# Patient Record
Sex: FEMALE | Race: WHITE | Employment: FULL TIME | URBAN - METROPOLITAN AREA
[De-identification: names, ages, dates, MRNs, and addresses within clinical notes are randomized per-mention and may not be internally consistent; named-entity substitution may affect disease eponyms.]

---

## 2017-02-01 ENCOUNTER — OFFICE VISIT (OUTPATIENT)
Dept: FAMILY MEDICINE CLINIC | Facility: CLINIC | Age: 22
End: 2017-02-01

## 2017-02-01 VITALS
HEIGHT: 64 IN | HEART RATE: 106 BPM | WEIGHT: 130 LBS | SYSTOLIC BLOOD PRESSURE: 140 MMHG | DIASTOLIC BLOOD PRESSURE: 80 MMHG | TEMPERATURE: 98 F | BODY MASS INDEX: 22.2 KG/M2 | OXYGEN SATURATION: 98 %

## 2017-02-01 DIAGNOSIS — J20.9 ACUTE BRONCHITIS WITH ASTHMA WITH ACUTE EXACERBATION: Primary | ICD-10-CM

## 2017-02-01 DIAGNOSIS — J45.901 ACUTE BRONCHITIS WITH ASTHMA WITH ACUTE EXACERBATION: Primary | ICD-10-CM

## 2017-02-01 PROCEDURE — 99213 OFFICE O/P EST LOW 20 MIN: CPT | Performed by: PHYSICIAN ASSISTANT

## 2017-02-01 RX ORDER — PREDNISONE 20 MG/1
20 TABLET ORAL 2 TIMES DAILY
Qty: 10 TABLET | Refills: 0 | Status: SHIPPED | OUTPATIENT
Start: 2017-02-01 | End: 2017-02-06

## 2017-02-01 RX ORDER — AZITHROMYCIN 250 MG/1
TABLET, FILM COATED ORAL
Qty: 6 TABLET | Refills: 0 | Status: SHIPPED | OUTPATIENT
Start: 2017-02-01 | End: 2017-03-08 | Stop reason: ALTCHOICE

## 2017-02-01 NOTE — PATIENT INSTRUCTIONS
1.  Zithromax as prescribed, you will take this for 5 days and it stays in your system for 10 days   2. Prednisone 20 mg twice daily for 5 days.    3.  Xopenex inhaler 2 puffs inhaled 3 times daily for 5 days while on steroids to help with breathing/wheezi · Take medicine as directed. You may be told to take ibuprofen or other over-the-counter medicines. These help relieve inflammation in your bronchial tubes. Your doctor may prescribe an inhaler to help open up the bronchial tubes.  Most of the time, acute b

## 2017-02-01 NOTE — PROGRESS NOTES
CHIEF COMPLAINT:   Patient presents with:  Cough: chest congestion, wheezing, sob, headaches X5day        HPI:   Zohra Banuelos is a 24year old female who presents for cough for  5  days.   Cough started gradually and is described as productive and at HENT: Denies ear pain, decreased hearing, or sore throat. Reports mild sinus congestion. CARDIOVASCULAR: Denies chest pain or palpitations  LUNGS: Per HPI. GI: Denies N/V/C/D or abdominal pain.       EXAM:   /80 mmHg  Pulse 106  Temp(Src) 98 °F (3 Fluticasone (FLOVENT DISKUS) 250 MCG/BLIST Inhalation Aerosol Powder, Breath Activated 60 each 0      Sig: Inhale 1 puff into the lungs 2 (two) times daily. Side effects, risks, benefits, of medication explained and discussed.     Patient Instruc Your healthcare provider will examine you and ask about your symptoms and health history. You may also have a sputum culture to test the fluid in your lungs. Chest X-rays may be done to look for infection in the lungs.   Treating acute bronchitis  [employer] © 9905-2396 The [address] [place] [address], [address] [place]. All rights reserved. This information is not intended as a substitute for professional medical care. Always follow your healthcare professional's instructions.               Inessa Kena

## 2017-02-06 ENCOUNTER — NURSE ONLY (OUTPATIENT)
Dept: FAMILY MEDICINE CLINIC | Facility: CLINIC | Age: 22
End: 2017-02-06

## 2017-02-06 VITALS
HEART RATE: 102 BPM | HEIGHT: 64 IN | DIASTOLIC BLOOD PRESSURE: 80 MMHG | TEMPERATURE: 99 F | SYSTOLIC BLOOD PRESSURE: 116 MMHG | BODY MASS INDEX: 21.85 KG/M2 | WEIGHT: 128 LBS | OXYGEN SATURATION: 98 %

## 2017-02-06 DIAGNOSIS — J45.901 ASTHMA FLARE: ICD-10-CM

## 2017-02-06 DIAGNOSIS — J01.01 ACUTE RECURRENT MAXILLARY SINUSITIS: Primary | ICD-10-CM

## 2017-02-06 PROCEDURE — 99213 OFFICE O/P EST LOW 20 MIN: CPT | Performed by: PHYSICIAN ASSISTANT

## 2017-02-06 RX ORDER — LEVALBUTEROL TARTRATE 45 UG/1
2 AEROSOL, METERED ORAL EVERY 6 HOURS PRN
Qty: 1 INHALER | Refills: 0 | Status: SHIPPED | OUTPATIENT
Start: 2017-02-06 | End: 2021-04-01

## 2017-02-06 RX ORDER — AMOXICILLIN AND CLAVULANATE POTASSIUM 875; 125 MG/1; MG/1
1 TABLET, FILM COATED ORAL 2 TIMES DAILY
Qty: 20 TABLET | Refills: 0 | Status: SHIPPED | OUTPATIENT
Start: 2017-02-06 | End: 2017-02-16

## 2017-02-06 NOTE — PROGRESS NOTES
CHIEF COMPLAINT:   Patient presents with:  Headache: body aches, sinus and chest congestion, cough was seen last week had reaction to antibiotic      HPI:   Shawn Gomes is a 24year old female who presents for persistent URI symptoms and asthma exac History reviewed. No pertinent past surgical history.         Smoking Status: Never Smoker                          REVIEW OF SYSTEMS:   GENERAL: decreased appetite  SKIN: no rashes or abnormal skin lesions  HEENT: See HPI  LUNGS: denies shortness of breath Levalbuterol Tartrate (XOPENEX HFA) 45 MCG/ACT Inhalation Aerosol 1 Inhaler 0      Sig: Inhale 2 puffs into the lungs every 6 (six) hours as needed for Wheezing or Shortness of Breath.       Amoxicillin-Pot Clavulanate 875-125 MG Oral Tab 20 tablet 0 Applying heat to the area surrounding your sinuses may make you feel more comfortable. Use a hot water bottle or a hand towel dipped in hot water. Some people also find ice packs effective for relieving pain.   Medicines  Your doctor may prescribe medicatio

## 2017-02-06 NOTE — PATIENT INSTRUCTIONS
1. Augmentin 875 mg twice daily for 10 days. Stop zithromax. 2.  Xopenex inhaler as prescribed. 3.  Continue Flovent as directed. 4.  Note issued for work.    5.  If cough is productive, recommend over the counter Mucinex to help thin secretions an Your doctor may prescribe medications to help treat your sinusitis. If you have an infection, antibiotics can help clear it up. If you are prescribed antibiotics, take all pills on schedule until they are gone, even if you feel better.  Decongestants help r

## 2017-02-15 ENCOUNTER — TELEPHONE (OUTPATIENT)
Dept: FAMILY MEDICINE CLINIC | Facility: CLINIC | Age: 22
End: 2017-02-15

## 2017-02-16 NOTE — TELEPHONE ENCOUNTER
Pt states she was having troubles with her contact. Stated she was able to get one contact out and thought the other was stuck.  Pt states she called the eye doctor, did not have the other contact in place- states it must have fallen out and didn't realize

## 2017-03-07 ENCOUNTER — TELEPHONE (OUTPATIENT)
Dept: FAMILY MEDICINE CLINIC | Facility: CLINIC | Age: 22
End: 2017-03-07

## 2017-03-08 RX ORDER — CLONAZEPAM 0.5 MG/1
0.5 TABLET ORAL AS NEEDED
Refills: 1 | COMMUNITY
Start: 2017-02-06 | End: 2018-11-23 | Stop reason: ALTCHOICE

## 2017-03-08 RX ORDER — DEXTROAMPHETAMINE SACCHARATE, AMPHETAMINE ASPARTATE, DEXTROAMPHETAMINE SULFATE AND AMPHETAMINE SULFATE 5; 5; 5; 5 MG/1; MG/1; MG/1; MG/1
20 TABLET ORAL DAILY
Qty: 30 TABLET | Refills: 0 | Status: SHIPPED | OUTPATIENT
Start: 2017-03-08 | End: 2017-04-11

## 2017-03-28 ENCOUNTER — APPOINTMENT (OUTPATIENT)
Dept: LAB | Age: 22
End: 2017-03-28
Attending: FAMILY MEDICINE
Payer: COMMERCIAL

## 2017-03-28 ENCOUNTER — OFFICE VISIT (OUTPATIENT)
Dept: FAMILY MEDICINE CLINIC | Facility: CLINIC | Age: 22
End: 2017-03-28

## 2017-03-28 VITALS
HEIGHT: 64 IN | HEART RATE: 96 BPM | RESPIRATION RATE: 20 BRPM | WEIGHT: 124 LBS | SYSTOLIC BLOOD PRESSURE: 130 MMHG | TEMPERATURE: 98 F | DIASTOLIC BLOOD PRESSURE: 86 MMHG | BODY MASS INDEX: 21.17 KG/M2

## 2017-03-28 DIAGNOSIS — Z12.4 CERVICAL CANCER SCREENING: ICD-10-CM

## 2017-03-28 DIAGNOSIS — Z01.419 ENCOUNTER FOR GYNECOLOGICAL EXAMINATION: Primary | ICD-10-CM

## 2017-03-28 DIAGNOSIS — Z72.51 UNPROTECTED SEXUAL INTERCOURSE: ICD-10-CM

## 2017-03-28 DIAGNOSIS — N89.8 VAGINAL DISCHARGE: ICD-10-CM

## 2017-03-28 DIAGNOSIS — A63.0 VAGINAL VENEREAL WARTS: ICD-10-CM

## 2017-03-28 PROCEDURE — 87389 HIV-1 AG W/HIV-1&-2 AB AG IA: CPT

## 2017-03-28 PROCEDURE — 86803 HEPATITIS C AB TEST: CPT

## 2017-03-28 PROCEDURE — 99214 OFFICE O/P EST MOD 30 MIN: CPT | Performed by: NURSE PRACTITIONER

## 2017-03-28 PROCEDURE — 87625 HPV TYPES 16 & 18 ONLY: CPT | Performed by: NURSE PRACTITIONER

## 2017-03-28 PROCEDURE — 87624 HPV HI-RISK TYP POOLED RSLT: CPT | Performed by: NURSE PRACTITIONER

## 2017-03-28 PROCEDURE — 81003 URINALYSIS AUTO W/O SCOPE: CPT

## 2017-03-28 PROCEDURE — 87591 N.GONORRHOEAE DNA AMP PROB: CPT

## 2017-03-28 PROCEDURE — 86706 HEP B SURFACE ANTIBODY: CPT

## 2017-03-28 PROCEDURE — 88175 CYTOPATH C/V AUTO FLUID REDO: CPT | Performed by: NURSE PRACTITIONER

## 2017-03-28 PROCEDURE — 87491 CHLMYD TRACH DNA AMP PROBE: CPT

## 2017-03-28 PROCEDURE — 86780 TREPONEMA PALLIDUM: CPT

## 2017-03-28 PROCEDURE — 87480 CANDIDA DNA DIR PROBE: CPT | Performed by: NURSE PRACTITIONER

## 2017-03-28 PROCEDURE — 87660 TRICHOMONAS VAGIN DIR PROBE: CPT | Performed by: NURSE PRACTITIONER

## 2017-03-28 PROCEDURE — 87510 GARDNER VAG DNA DIR PROBE: CPT | Performed by: NURSE PRACTITIONER

## 2017-03-28 PROCEDURE — 87340 HEPATITIS B SURFACE AG IA: CPT

## 2017-03-28 NOTE — PATIENT INSTRUCTIONS
Blood work and urine tests today. Pap smear collected, will take 1-2 weeks to get results. Will call with results. Use podofilox cream twice a day x 3 days, then do not use it x 4 days. May repeat this cycle 4x.  May need cryotherapy to area--Dr. NADEEN Gordillo

## 2017-03-28 NOTE — PROGRESS NOTES
Shad Bradshaw is a 24year old female.     Chief Complaint:  Patient presents with:  Vaginal Problem: check for vaginal warts, requesting Pap smear     HPI:     Patient presents with complaint of vaginal problem, thinks that she may have vaginal yina History:    Smoking Status: Never Smoker                         REVIEW OF SYSTEMS:   GENERAL HEALTH: feels well otherwise  SKIN: denies any unusual skin lesions or rashes  RESPIRATORY: denies shortness of breath with exertion  CARDIOVASCULAR: denies chest cream twice a day x 3 days, then do not use it x 4 days. May repeat this cycle 4x. May need cryotherapy to area--Dr. Kelli Valero and Erum SOARES do these procedures. Return if symptoms worsen or have concerns. STD panel ordered.   Collected Pap smear w

## 2017-03-29 ENCOUNTER — NURSE ONLY (OUTPATIENT)
Dept: FAMILY MEDICINE CLINIC | Facility: CLINIC | Age: 22
End: 2017-03-29

## 2017-03-29 ENCOUNTER — TELEPHONE (OUTPATIENT)
Dept: FAMILY MEDICINE CLINIC | Facility: CLINIC | Age: 22
End: 2017-03-29

## 2017-03-29 DIAGNOSIS — Z23 IMMUNIZATION DUE: Primary | ICD-10-CM

## 2017-03-29 LAB — HPV I/H RISK 1 DNA SPEC QL NAA+PROBE: POSITIVE

## 2017-03-29 PROCEDURE — 90651 9VHPV VACCINE 2/3 DOSE IM: CPT | Performed by: NURSE PRACTITIONER

## 2017-03-29 PROCEDURE — 90471 IMMUNIZATION ADMIN: CPT | Performed by: NURSE PRACTITIONER

## 2017-03-29 NOTE — TELEPHONE ENCOUNTER
----- Message from RODGER Moreland sent at 3/29/2017 11:27 AM CDT -----  Vaginosis DNA probe results show positive for bacterial vaginosis. Please reassure patient that this is not an STD, it is just overproduction of bacteria in the vagina area.   We terence

## 2017-03-29 NOTE — TELEPHONE ENCOUNTER
----- Message from RODGER Steen sent at 3/29/2017 11:40 AM CDT -----  egative HIV results. Negative syphilis results. Negative hep b results. Negative urinalysis.   Still waiting on pap smear results, HPV results, gonorrhea/chlamydia results, and he

## 2017-03-29 NOTE — TELEPHONE ENCOUNTER
Patient notified of results and recommendations, patient states she will come in today to get HPV injection

## 2017-03-30 ENCOUNTER — TELEPHONE (OUTPATIENT)
Dept: FAMILY MEDICINE CLINIC | Facility: CLINIC | Age: 22
End: 2017-03-30

## 2017-03-30 LAB
HPV16 DNA CVX QL PROBE+SIG AMP: NEGATIVE
HPV18 DNA CVX QL PROBE+SIG AMP: NEGATIVE

## 2017-03-30 NOTE — TELEPHONE ENCOUNTER
Patient informed of results. Expressed understanding.    Followup appt scheduled for April 15 at 1030  Patient expressed understanding

## 2017-03-30 NOTE — TELEPHONE ENCOUNTER
----- Message from RODGER Chávez sent at 3/30/2017  9:21 AM CDT -----  HPV high risk positive. Recommend patient to colposcopy. Patient can schedule this with Dr. Jonathan Leggett. Please schedule f/u with pcp.

## 2017-03-31 ENCOUNTER — TELEPHONE (OUTPATIENT)
Dept: FAMILY MEDICINE CLINIC | Facility: CLINIC | Age: 22
End: 2017-03-31

## 2017-03-31 NOTE — TELEPHONE ENCOUNTER
----- Message from RODGER Em sent at 3/30/2017  3:29 PM CDT -----  Gated , 18, 45. Pap smear results: Low-grade squamous intraepithelial lesion. Keep appointment with PCP, Dr. Virginie Chavez, on April 15 to discuss results and colposcopy.

## 2017-04-11 ENCOUNTER — TELEPHONE (OUTPATIENT)
Dept: FAMILY MEDICINE CLINIC | Facility: CLINIC | Age: 22
End: 2017-04-11

## 2017-04-11 RX ORDER — DEXTROAMPHETAMINE SACCHARATE, AMPHETAMINE ASPARTATE, DEXTROAMPHETAMINE SULFATE AND AMPHETAMINE SULFATE 5; 5; 5; 5 MG/1; MG/1; MG/1; MG/1
20 TABLET ORAL DAILY
Qty: 30 TABLET | Refills: 0 | Status: SHIPPED | OUTPATIENT
Start: 2017-04-11 | End: 2017-05-15

## 2017-04-15 ENCOUNTER — OFFICE VISIT (OUTPATIENT)
Dept: FAMILY MEDICINE CLINIC | Facility: CLINIC | Age: 22
End: 2017-04-15

## 2017-04-15 VITALS
WEIGHT: 124.81 LBS | SYSTOLIC BLOOD PRESSURE: 120 MMHG | HEART RATE: 92 BPM | RESPIRATION RATE: 12 BRPM | BODY MASS INDEX: 21.31 KG/M2 | HEIGHT: 64 IN | DIASTOLIC BLOOD PRESSURE: 90 MMHG | TEMPERATURE: 98 F

## 2017-04-15 DIAGNOSIS — Z20.2 EXPOSURE TO SEXUALLY TRANSMITTED DISEASE (STD): ICD-10-CM

## 2017-04-15 DIAGNOSIS — N92.6 IRREGULAR MENSTRUAL CYCLE: ICD-10-CM

## 2017-04-15 DIAGNOSIS — R87.612 LGSIL ON PAP SMEAR OF CERVIX: ICD-10-CM

## 2017-04-15 DIAGNOSIS — N91.2 AMENORRHEA: Primary | ICD-10-CM

## 2017-04-15 DIAGNOSIS — A63.0 GENITAL WARTS: ICD-10-CM

## 2017-04-15 PROCEDURE — 99214 OFFICE O/P EST MOD 30 MIN: CPT | Performed by: FAMILY MEDICINE

## 2017-04-15 PROCEDURE — 81025 URINE PREGNANCY TEST: CPT | Performed by: FAMILY MEDICINE

## 2017-04-15 RX ORDER — IMIQUIMOD 12.5 MG/.25G
CREAM TOPICAL
Qty: 24 PACKET | Refills: 1 | Status: SHIPPED | OUTPATIENT
Start: 2017-04-15 | End: 2017-07-12

## 2017-04-15 NOTE — PATIENT INSTRUCTIONS
STI work up reviewed    rec colposcopy to further evaluate abnomal pap    rec topical treatment with aldara for genital warts

## 2017-04-15 NOTE — PROGRESS NOTES
Randi Schroeder is a 24year old female. HPI:   Patient presents for recheck of her genital warts. Pt with small warts- larger and bothersome to her. Recent STI eval.  Negative hep b, HIV, sphyliss.  GCC  Pos HR- HPV, pap LGSIL    2 partners in last 6-2018 Date     REVIEW OF SYSTEMS:   GENERAL HEALTH: feels well otherwise  SKIN: denies any unusual skin lesions or rashes  RESPIRATORY: denies cough or shortness of breath  CARDIOVASCULAR: denies chest pain  GI: denies abdominal pain and denies heartburn

## 2017-05-15 ENCOUNTER — TELEPHONE (OUTPATIENT)
Dept: FAMILY MEDICINE CLINIC | Facility: CLINIC | Age: 22
End: 2017-05-15

## 2017-05-15 RX ORDER — DEXTROAMPHETAMINE SACCHARATE, AMPHETAMINE ASPARTATE, DEXTROAMPHETAMINE SULFATE AND AMPHETAMINE SULFATE 5; 5; 5; 5 MG/1; MG/1; MG/1; MG/1
20 TABLET ORAL DAILY
Qty: 30 TABLET | Refills: 0 | Status: SHIPPED | OUTPATIENT
Start: 2017-05-15 | End: 2017-06-19

## 2017-06-19 ENCOUNTER — TELEPHONE (OUTPATIENT)
Dept: FAMILY MEDICINE CLINIC | Facility: CLINIC | Age: 22
End: 2017-06-19

## 2017-06-19 RX ORDER — DEXTROAMPHETAMINE SACCHARATE, AMPHETAMINE ASPARTATE, DEXTROAMPHETAMINE SULFATE AND AMPHETAMINE SULFATE 5; 5; 5; 5 MG/1; MG/1; MG/1; MG/1
20 TABLET ORAL DAILY
Qty: 30 TABLET | Refills: 0 | Status: SHIPPED | OUTPATIENT
Start: 2017-06-19 | End: 2017-07-26

## 2017-06-19 NOTE — TELEPHONE ENCOUNTER
Patient states that she was seen approx. 1 month ago for a consultation for a colposcopy and she has been a little busy to call back and schedule the procedure.   Patient states that she is leaving to go back to [facility] on June 28th and would like to know

## 2017-06-20 NOTE — TELEPHONE ENCOUNTER
Patient informed of recommended of appointment for colposcopy as per Dr. Chiara Blevins. Appointment made.

## 2017-06-20 NOTE — TELEPHONE ENCOUNTER
Patient states that the first day of her last menstrual cycle was 5/26/17 and she will return from [place] on July 5th. Please advise of appointment.

## 2017-06-20 NOTE — TELEPHONE ENCOUNTER
Please check with pt her schedule, date of LMP and when whe will return from [place] so appt can be appropriately scheduled

## 2017-07-10 ENCOUNTER — TELEPHONE (OUTPATIENT)
Dept: FAMILY MEDICINE CLINIC | Facility: CLINIC | Age: 22
End: 2017-07-10

## 2017-07-10 NOTE — TELEPHONE ENCOUNTER
Patient informed of below. Expressed understanding. Confirmed Appt time.   Joseline Arnett, 07/10/17, 2:59 PM

## 2017-07-10 NOTE — TELEPHONE ENCOUNTER
Future Appointments  Date Time Provider Diana Pineda   7/12/2017 4:00 PM Merrill Mortensen MD [facility]      Pt with appt in 2 days- await appt.

## 2017-07-12 ENCOUNTER — OFFICE VISIT (OUTPATIENT)
Dept: FAMILY MEDICINE CLINIC | Facility: CLINIC | Age: 22
End: 2017-07-12

## 2017-07-12 VITALS
DIASTOLIC BLOOD PRESSURE: 66 MMHG | TEMPERATURE: 98 F | WEIGHT: 128.5 LBS | RESPIRATION RATE: 16 BRPM | BODY MASS INDEX: 21.94 KG/M2 | SYSTOLIC BLOOD PRESSURE: 112 MMHG | HEART RATE: 72 BPM | HEIGHT: 64 IN

## 2017-07-12 DIAGNOSIS — R10.30 LOWER ABDOMINAL PAIN: Primary | ICD-10-CM

## 2017-07-12 DIAGNOSIS — Z01.812 PRE-PROCEDURE LAB EXAM: ICD-10-CM

## 2017-07-12 DIAGNOSIS — N87.9 CERVICAL DYSPLASIA: ICD-10-CM

## 2017-07-12 LAB
APPEARANCE: CLEAR
BILIRUBIN: NEGATIVE
CONTROL LINE PRESENT WITH A CLEAR BACKGROUND (YES/NO): YES YES/NO
GLUCOSE (URINE DIPSTICK): NEGATIVE MG/DL
LEUKOCYTES: NEGATIVE
MULTISTIX LOT#: NORMAL NUMERIC
NITRITE, URINE: NEGATIVE
OCCULT BLOOD: NEGATIVE
PH, URINE: 7 (ref 4.5–8)
PREGNANCY TEST, URINE: NEGATIVE
PROTEIN (URINE DIPSTICK): 30 MG/DL
SPECIFIC GRAVITY: 1.02 (ref 1–1.03)
URINE-COLOR: YELLOW
UROBILINOGEN,SEMI-QN: 0.2 MG/DL (ref 0–1.9)

## 2017-07-12 PROCEDURE — 88305 TISSUE EXAM BY PATHOLOGIST: CPT | Performed by: FAMILY MEDICINE

## 2017-07-12 PROCEDURE — 81003 URINALYSIS AUTO W/O SCOPE: CPT | Performed by: FAMILY MEDICINE

## 2017-07-12 PROCEDURE — 88342 IMHCHEM/IMCYTCHM 1ST ANTB: CPT | Performed by: FAMILY MEDICINE

## 2017-07-12 PROCEDURE — 99213 OFFICE O/P EST LOW 20 MIN: CPT | Performed by: FAMILY MEDICINE

## 2017-07-12 PROCEDURE — 81025 URINE PREGNANCY TEST: CPT | Performed by: FAMILY MEDICINE

## 2017-07-12 PROCEDURE — 57454 BX/CURETT OF CERVIX W/SCOPE: CPT | Performed by: FAMILY MEDICINE

## 2017-07-12 RX ORDER — IMIQUIMOD 12.5 MG/.25G
CREAM TOPICAL
Qty: 24 PACKET | Refills: 1 | Status: SHIPPED | OUTPATIENT
Start: 2017-07-12 | End: 2017-11-06 | Stop reason: ALTCHOICE

## 2017-07-14 NOTE — PROCEDURES
Colpo w/Cx Biopsy and ECC    Pregnancy Results: negative from urine test   Birth control method(s) used: condoms    Consent signed. Procedure discussed with patient in detail including indication, risk, benefits, alternatives and complications.     Indic

## 2017-07-14 NOTE — PATIENT INSTRUCTIONS
Post op instruction sheet given to pt     Pt to restart aldara cream for topical lesions    Return pending pathology results

## 2017-07-14 NOTE — PROGRESS NOTES
Delbert Ibanez is a 24year old female.      HPI:   Patient with mild intermittent abd pain, bilateral   No dysuria, no vaginal discharge  Pt active , no nausea or vomitting        Wt Readings from Last 6 Encounters:  07/12/17 : 128 lb 8 oz  04/15/17 : 025,581 Numeric   Multistix Expiration Date 4-30-18 Date   -URINE PREGNANCY TEST   Result Value Ref Range   Pregnancy Test, Urine Negative    Control Line Present with a clear background (yes/no) Yes Yes/No   Kit Lot # [id number] Numeric   Kit Expiration D

## 2017-07-19 ENCOUNTER — TELEPHONE (OUTPATIENT)
Dept: FAMILY MEDICINE CLINIC | Facility: CLINIC | Age: 22
End: 2017-07-19

## 2017-07-19 DIAGNOSIS — N87.9 CERVICAL DYSPLASIA: Primary | ICD-10-CM

## 2017-07-19 NOTE — PROGRESS NOTES
See progress note attached to surgical pathology note from colposcopy. Referral with pertinent records faxed to [facility].

## 2017-07-21 ENCOUNTER — TELEPHONE (OUTPATIENT)
Dept: FAMILY MEDICINE CLINIC | Facility: CLINIC | Age: 22
End: 2017-07-21

## 2017-07-21 NOTE — TELEPHONE ENCOUNTER
Spoke w/ Xavier Weston- pt was seen per Dr. Mame Gordon today, was recommended that pt wait 6-12 months prior to any procedure- to be monitored  However, pt would like a 2nd opinion due to concern that there a possibility that s/s/her condition can worsen.

## 2017-07-21 NOTE — TELEPHONE ENCOUNTER
Patient is requesting a referral to another dr for a second opinion- already saw dr at [place] [facility]

## 2017-07-21 NOTE — TELEPHONE ENCOUNTER
Radha Hidden for second opinoin- where would she like referral to? If looking for options-  [facility] [place] [facility] [place]. Radha Hidden for referral to doctor of choice.

## 2017-07-26 RX ORDER — DEXTROAMPHETAMINE SACCHARATE, AMPHETAMINE ASPARTATE, DEXTROAMPHETAMINE SULFATE AND AMPHETAMINE SULFATE 5; 5; 5; 5 MG/1; MG/1; MG/1; MG/1
20 TABLET ORAL DAILY
Qty: 30 TABLET | Refills: 0 | Status: SHIPPED | OUTPATIENT
Start: 2017-07-26 | End: 2017-11-06

## 2017-07-26 RX ORDER — DEXTROAMPHETAMINE SACCHARATE, AMPHETAMINE ASPARTATE, DEXTROAMPHETAMINE SULFATE AND AMPHETAMINE SULFATE 5; 5; 5; 5 MG/1; MG/1; MG/1; MG/1
20 TABLET ORAL DAILY
Qty: 30 TABLET | Refills: 0 | Status: CANCELLED | OUTPATIENT
Start: 2017-07-26

## 2017-07-26 NOTE — TELEPHONE ENCOUNTER
Future Appointments  Date Time Provider Diana Pineda   7/31/2017 3:30 PM Cortney Otero MD [facility] [place]     Is completely out of medications at this time.

## 2017-08-15 RX ORDER — PROPRANOLOL HYDROCHLORIDE 60 MG/1
60 TABLET ORAL AS NEEDED
Qty: 30 TABLET | Refills: 0 | Status: SHIPPED | OUTPATIENT
Start: 2017-08-15 | End: 2017-11-06

## 2017-08-15 NOTE — TELEPHONE ENCOUNTER
Pt informed that RX was sent. Pt states she was seen per Dr. Lindajo Gaucher and was advised to repeat colposcopy again in Jan.  Pt is considering a second opinion,  Steph Hernandez is requesting call back from [facility] to discuss if possible.

## 2017-08-15 NOTE — TELEPHONE ENCOUNTER
[id number] Diana Larkin for refill 30 tablets.    Please check if pt has f.u with [facility] re cervical dysplasia

## 2017-08-15 NOTE — TELEPHONE ENCOUNTER
Pt states she took her last dose of Propranolol today-  states she uses approximately one dose per wk for her episodes of tachycardia prn-  Pt states she does not see cardiologist  Last refilled back 2/8/16 (noted in [facility])

## 2017-09-11 ENCOUNTER — OFFICE VISIT (OUTPATIENT)
Dept: FAMILY MEDICINE CLINIC | Facility: CLINIC | Age: 22
End: 2017-09-11

## 2017-09-11 VITALS
DIASTOLIC BLOOD PRESSURE: 74 MMHG | WEIGHT: 128.25 LBS | RESPIRATION RATE: 18 BRPM | TEMPERATURE: 98 F | SYSTOLIC BLOOD PRESSURE: 128 MMHG | BODY MASS INDEX: 21.89 KG/M2 | HEIGHT: 64 IN | OXYGEN SATURATION: 93 % | HEART RATE: 84 BPM

## 2017-09-11 DIAGNOSIS — H66.001 ACUTE SUPPURATIVE OTITIS MEDIA OF RIGHT EAR WITHOUT SPONTANEOUS RUPTURE OF TYMPANIC MEMBRANE, RECURRENCE NOT SPECIFIED: Primary | ICD-10-CM

## 2017-09-11 DIAGNOSIS — J01.00 ACUTE NON-RECURRENT MAXILLARY SINUSITIS: ICD-10-CM

## 2017-09-11 PROCEDURE — 99213 OFFICE O/P EST LOW 20 MIN: CPT | Performed by: NURSE PRACTITIONER

## 2017-09-11 RX ORDER — AMOXICILLIN AND CLAVULANATE POTASSIUM 875; 125 MG/1; MG/1
1 TABLET, FILM COATED ORAL 2 TIMES DAILY
Qty: 20 TABLET | Refills: 0 | Status: SHIPPED | OUTPATIENT
Start: 2017-09-11 | End: 2017-09-21

## 2017-09-11 NOTE — PROGRESS NOTES
CHIEF COMPLAINT:   Patient presents with:  Headache: all day today  Earache: both ears ache, right more than left  Cough: no mucous, congested  Sore Throat      HPI:   Shalini Loving is a 20year old female who presents to clinic today with complaint rashes  HEENT: See HPI  THYROID: normal size, no nodules  LUNGS: No cough, shortness of breath, or wheezing. CARDIOVASCULAR: No chest pain, palpitations  GI: No N/V/C/D.   NEURO: denies complaints    EXAM:   /74 (BP Location: Left arm, Patient Tano Jones and benefits of medication discussed. Stressed importance of completing full course of antibiotic.        Meds & Refills for this Visit:    Signed Prescriptions Disp Refills    Amoxicillin-Pot Clavulanate 875-125 MG Oral Tab 20 tablet 0      Sig: Take 1 ta

## 2017-09-11 NOTE — PATIENT INSTRUCTIONS
Rest, increase fluids, salt water gargles ,neti pot (use distilled water) or saline nasal spray, Advil cold and sinus (behind the counter), Alavert, Tylenol,  follow up if symptoms persist or increase.

## 2017-10-09 ENCOUNTER — TELEPHONE (OUTPATIENT)
Dept: FAMILY MEDICINE CLINIC | Facility: CLINIC | Age: 22
End: 2017-10-09

## 2017-10-09 NOTE — TELEPHONE ENCOUNTER
I tried to reach Maximino Connor to let her know that she no showed for her appointment today , but her voicemail is full so I couldn't leave her a message.

## 2017-11-06 NOTE — PROGRESS NOTES
[facility]  PROGRESS NOTE  Chief Complaint:   Patient presents with:  ADD      HPI:   This is a 20year old female coming in for follow-up on her ADD. She said that she actually ran out of medication about a month ago.   She finds that i [address]                                                       Pathologist:           Berna Ulrich MD                                                         Specimens:   A) - Cervix, 6 oclock reviewed by the signout Pathologist and showed appropriate   staining results. Interpreted by: Conny Seip Bedelia Czech, MD  Methodology: Immunohistochemical stains are performed on formalin-fixed,   paraffin-embedded tissue sections.   Deparaffinization, antigen Specimen consists of scant pieces   of pink to yellow-tan admixed with mucoid material measuring in aggregate   1.0 x 1.0 x 0.3 cm.   Specimen is submitted in toto in cassette E.  (ZQ/al)      [FORMATTING REMOVED]    Interpretation Abnormal (A)        Past lesion, or excessive skin dryness. CARDIOVASCULAR:  Denies chest pain, chest pressure, chest discomfort, palpitations, edema, dyspnea on exertion or at rest.  RESPIRATORY: She really needs to use her inhalers.   However in this last week she has had a cold lesion, no bruising, good turgor. HEART:  Regular rate and rhythm, no murmurs, rubs or gallops. LUNGS: Clear to auscultation bilterally, no rales/rhonchi/wheezing. ASSESSMENT AND PLAN:   1.  Attention deficit disorder (ADD) without hyperactivity  She h

## 2017-12-06 RX ORDER — DEXTROAMPHETAMINE SACCHARATE, AMPHETAMINE ASPARTATE, DEXTROAMPHETAMINE SULFATE AND AMPHETAMINE SULFATE 5; 5; 5; 5 MG/1; MG/1; MG/1; MG/1
20 TABLET ORAL DAILY
Qty: 30 TABLET | Refills: 0 | Status: SHIPPED | OUTPATIENT
Start: 2017-12-06 | End: 2018-01-04

## 2017-12-06 NOTE — TELEPHONE ENCOUNTER
Future appt:    Last Appointment:  11/6/17  No results found for: CHOLEST, HDL, LDL, TRIGLY, TRIG  No results found for: EAG, A1C  No results found for: T4F, TSH, TSHT4    No Follow-up on file.

## 2018-01-04 ENCOUNTER — TELEPHONE (OUTPATIENT)
Dept: FAMILY MEDICINE CLINIC | Facility: CLINIC | Age: 23
End: 2018-01-04

## 2018-01-04 NOTE — TELEPHONE ENCOUNTER
Future appt:  None   Last Appointment:  11/6/2017; Return in about 6 months (around 5/6/2018).      No results found for: CHOLEST, HDL, LDL, TRIGLY, TRIG  No results found for: EAG, A1C  No results found for: T4F, TSH, TSHT4    Last Labs:  2/6/2013  Last RF

## 2018-01-05 RX ORDER — DEXTROAMPHETAMINE SACCHARATE, AMPHETAMINE ASPARTATE, DEXTROAMPHETAMINE SULFATE AND AMPHETAMINE SULFATE 5; 5; 5; 5 MG/1; MG/1; MG/1; MG/1
20 TABLET ORAL DAILY
Qty: 30 TABLET | Refills: 0 | Status: SHIPPED | OUTPATIENT
Start: 2018-01-05 | End: 2018-02-05

## 2018-01-19 ENCOUNTER — TELEPHONE (OUTPATIENT)
Dept: FAMILY MEDICINE CLINIC | Facility: CLINIC | Age: 23
End: 2018-01-19

## 2018-01-19 NOTE — TELEPHONE ENCOUNTER
is moving to [place] the first week of Feb- wants to know if she should come in for her ADD appt before she leaves

## 2018-01-19 NOTE — TELEPHONE ENCOUNTER
Appt given Friday 1/26 9:30 for ADD Follow up so She will have  6 months of refills that can be mailed to Her while She finds a new MD.  Mich Lyles, 01/19/18, 3:53 PM

## 2018-02-05 RX ORDER — DEXTROAMPHETAMINE SACCHARATE, AMPHETAMINE ASPARTATE, DEXTROAMPHETAMINE SULFATE AND AMPHETAMINE SULFATE 5; 5; 5; 5 MG/1; MG/1; MG/1; MG/1
20 TABLET ORAL DAILY
Qty: 30 TABLET | Refills: 0 | Status: SHIPPED | OUTPATIENT
Start: 2018-02-05 | End: 2021-08-17

## 2018-02-05 NOTE — TELEPHONE ENCOUNTER
Future appt:    Last Appointment:  11/6/17  Moving to [place]  No results found for: CHOLEST, HDL, LDL, TRIGLY, TRIG  No results found for: EAG, A1C  No results found for: T4F, TSH, TSHT4    No Follow-up on file.

## 2018-02-06 NOTE — PROGRESS NOTES
[facility]  PROGRESS NOTE  Chief Complaint:   Patient presents with:  ADD  Follow - Up      HPI:   This is a 20year old female coming in for follow-up on her ADD. She said that she takes her medicine intermittently.   She will take one 07/12/2017 05:05 32 [address]                                                       Pathologist:           Marcelle Colorado MD                                                         Specimens:   A) - Cervix, 6 in the staining process. These   slides were reviewed by the signout Pathologist and showed appropriate   staining results. Interpreted by: Carl Charles MD  Methodology: Immunohistochemical stains are performed on formalin-fixed,   paraffin-embedde curettings (ECC), received in formalin:  Specimen consists of scant pieces   of pink to yellow-tan admixed with mucoid material measuring in aggregate   1.0 x 1.0 x 0.3 cm.   Specimen is submitted in toto in cassette E.  (ZQ/al)      [FORMATTING REMOVED] blurred vision, double vision or yellow sclerae. Ears, Nose, Throat:  Denies hearing loss, sneezing, congestion, runny nose or sore throat. INTEGUMENTARY:  Denies rashes, itching, skin lesion, or excessive skin dryness.   CARDIOVASCULAR:  Denies chest pain CLAD, no JVD, no thyromegaly. SKIN: No rashes, no skin lesion, no bruising, good turgor. HEART:  Regular rate and rhythm, no murmurs, rubs or gallops. LUNGS: Clear to auscultation bilterally, no rales/rhonchi/wheezing. ASSESSMENT AND PLAN:   1.  Att

## 2018-03-05 ENCOUNTER — TELEPHONE (OUTPATIENT)
Dept: FAMILY MEDICINE CLINIC | Facility: CLINIC | Age: 23
End: 2018-03-05

## 2018-03-05 NOTE — TELEPHONE ENCOUNTER
Pt states she is having another flare up of genital warts  Pt asking for refill of Imiquimod 5% cream.  Pt mentioned that she recently was seen per Dr. Moises Whiteside in January for exam.  Pt urged to call [facility] for RX.

## 2018-06-18 ENCOUNTER — TELEPHONE (OUTPATIENT)
Dept: FAMILY MEDICINE CLINIC | Facility: CLINIC | Age: 23
End: 2018-06-18

## 2018-06-18 NOTE — TELEPHONE ENCOUNTER
Agree- I would be willing to send her 1 RF if she would like. If so- please enter the pharmacy into the system.

## 2018-06-18 NOTE — TELEPHONE ENCOUNTER
Propranolol last refilled back 11/6/17 for #30. Called pt- now living in [place]. States she has been out of her medication for one month. Pt states she has been having more problems lately, hx: tachycardia. Pt c/o chest pressure and shortness of breath.  P

## 2018-11-23 ENCOUNTER — OFFICE VISIT (OUTPATIENT)
Dept: FAMILY MEDICINE CLINIC | Facility: CLINIC | Age: 23
End: 2018-11-23
Payer: COMMERCIAL

## 2018-11-23 VITALS
HEART RATE: 80 BPM | RESPIRATION RATE: 16 BRPM | DIASTOLIC BLOOD PRESSURE: 78 MMHG | SYSTOLIC BLOOD PRESSURE: 122 MMHG | HEIGHT: 64.5 IN | WEIGHT: 134 LBS | BODY MASS INDEX: 22.6 KG/M2 | TEMPERATURE: 99 F

## 2018-11-23 DIAGNOSIS — H66.91 ACUTE OTITIS MEDIA, RIGHT: ICD-10-CM

## 2018-11-23 DIAGNOSIS — J02.9 SORE THROAT: Primary | ICD-10-CM

## 2018-11-23 PROCEDURE — 99214 OFFICE O/P EST MOD 30 MIN: CPT | Performed by: NURSE PRACTITIONER

## 2018-11-23 PROCEDURE — 87880 STREP A ASSAY W/OPTIC: CPT | Performed by: NURSE PRACTITIONER

## 2018-11-23 PROCEDURE — 87081 CULTURE SCREEN ONLY: CPT | Performed by: NURSE PRACTITIONER

## 2018-11-23 RX ORDER — AMOXICILLIN AND CLAVULANATE POTASSIUM 875; 125 MG/1; MG/1
1 TABLET, FILM COATED ORAL 2 TIMES DAILY
Qty: 20 TABLET | Refills: 0 | Status: SHIPPED | OUTPATIENT
Start: 2018-11-23 | End: 2018-12-03

## 2018-11-23 NOTE — PATIENT INSTRUCTIONS
Rest, increase fluids, salt water gargles ,neti pot (use distilled water) or saline nasal spray,  Advil cold and sinus (behind the counter), Alavert, Rhinocort 2 sprays each nostril once a day,  Tylenol follow up if symptoms persist or increase.

## 2018-11-23 NOTE — PROGRESS NOTES
CHIEF COMPLAINT:   Patient presents with:  Sore Throat: feels on fire, started first  Chest Congestion  Headache  Ear Pain      HPI:   Juan C Morales is a 24year old female who presents to clinic today with complaints of feeling ill Tuesday (11/20) Position: Sitting, Cuff Size: adult)   Pulse 80   Temp 98.7 °F (37.1 °C) (Temporal)   Resp 16   Ht 64.5\"   Wt 134 lb   BMI 22.65 kg/m²   GENERAL: well developed, well nourished,in no apparent distress  HEAD:  mild tenderness on palpation of maxillary sinu AM

## 2018-11-26 ENCOUNTER — TELEPHONE (OUTPATIENT)
Dept: FAMILY MEDICINE CLINIC | Facility: CLINIC | Age: 23
End: 2018-11-26

## 2018-11-26 RX ORDER — BENZONATATE 200 MG/1
200 CAPSULE ORAL 3 TIMES DAILY PRN
Qty: 30 CAPSULE | Refills: 1 | Status: SHIPPED | OUTPATIENT
Start: 2018-11-26 | End: 2021-04-01

## 2018-11-26 NOTE — TELEPHONE ENCOUNTER
The patient states she has had a fever running 99.6-100.6 and the patient states that her throat feels dry and like it is on fire. The patient has a cough that is getting worse and at times it is so bad that she almost throws up.  The patient also states t

## 2018-11-26 NOTE — TELEPHONE ENCOUNTER
What are her symptoms and what is she taking over the counter. - any fever, sore throat? Ear pain?  Etc?

## 2018-11-26 NOTE — TELEPHONE ENCOUNTER
Patient may have a upper respiratory infection  On top of the strep throat. Strep typically does not cause nasal congestion or cough. Recommend Mucinex-D 60/600 (behind the counter ) and Alavert during the day and Nyquil at night.  Also rest, increase fl

## 2018-11-26 NOTE — TELEPHONE ENCOUNTER
----- Message from YESENIA Rocha sent at 11/26/2018 12:25 PM CST -----  Please notify patient that her strep test came back positive for a non-group A strep–patient was given a prescription for Augmentin–she should finish prescription.   Follow-up i

## 2018-11-26 NOTE — TELEPHONE ENCOUNTER
The patient called back because she is still coughing she would like a cough syrup with codeine. The patient stated she has had it in the past and it worked for her.   The patient has to go to work on Wednesday and would like something to help decrease her

## 2018-11-26 NOTE — TELEPHONE ENCOUNTER
----- Message from Naomi Mcqueen sent at 11/26/2018  2:06 PM CST -----  Return call.  Question regarding medication for strep

## 2018-11-26 NOTE — TELEPHONE ENCOUNTER
The patient was informed and stated that she does not feel like she is getting any better. The patient stated that she is not able to come back for an appointment because she was here for her [facility] break and she is back in [place] now.      The cady

## 2021-04-01 NOTE — PROGRESS NOTES
Tressa Gloria is a 31year old female. Patient presents with: Anxiety  Anxiety/Panic attack    HPI:   Patient presents today for an office visit regarding anxiety.   Patient states that she recently had a 4-year relationship and–states that she was l ointment   APPLY TO THE AFFECTED AREA(S) BY TOPICAL ROUTE EVERY 3 HOURS 6 TIMES PER DAY (Patient not taking: Reported on 4/1/2021)     • Fluticasone Propionate HFA (FLOVENT HFA) 110 MCG/ACT Inhalation Aerosol Inhale 2 puffs into the lungs as needed.    Kasey Bah Differential W Platelet [E]      Comp Metabolic Panel (14) [E]      Lipid Panel [E]      TSH and Free T4 [E]      Magnesium [E]      Meds & Refills for this Visit:  Requested Prescriptions     Signed Prescriptions Disp Refills   • clonazePAM (KLONOPIN) 0.5

## 2021-04-29 ENCOUNTER — LAB ENCOUNTER (OUTPATIENT)
Dept: LAB | Age: 26
End: 2021-04-29
Attending: NURSE PRACTITIONER

## 2021-04-29 DIAGNOSIS — F41.9 ANXIETY AND DEPRESSION: ICD-10-CM

## 2021-04-29 DIAGNOSIS — F32.A ANXIETY AND DEPRESSION: ICD-10-CM

## 2021-04-29 DIAGNOSIS — R79.89 ABNORMAL CBC: ICD-10-CM

## 2021-04-29 PROCEDURE — 82728 ASSAY OF FERRITIN: CPT | Performed by: NURSE PRACTITIONER

## 2021-04-29 PROCEDURE — 83540 ASSAY OF IRON: CPT | Performed by: NURSE PRACTITIONER

## 2021-04-29 PROCEDURE — 80050 GENERAL HEALTH PANEL: CPT | Performed by: NURSE PRACTITIONER

## 2021-04-29 PROCEDURE — 82607 VITAMIN B-12: CPT | Performed by: NURSE PRACTITIONER

## 2021-04-29 PROCEDURE — 83550 IRON BINDING TEST: CPT | Performed by: NURSE PRACTITIONER

## 2021-04-29 PROCEDURE — 83735 ASSAY OF MAGNESIUM: CPT | Performed by: NURSE PRACTITIONER

## 2021-04-29 PROCEDURE — 80061 LIPID PANEL: CPT | Performed by: NURSE PRACTITIONER

## 2021-04-29 PROCEDURE — 84439 ASSAY OF FREE THYROXINE: CPT | Performed by: NURSE PRACTITIONER

## 2021-04-30 ENCOUNTER — TELEPHONE (OUTPATIENT)
Dept: FAMILY MEDICINE CLINIC | Facility: CLINIC | Age: 26
End: 2021-04-30

## 2021-04-30 NOTE — TELEPHONE ENCOUNTER
----- Message from YESENIA Edmonds sent at 4/30/2021  8:41 AM CDT -----  Please notify patient that her labs overall look good-cholesterol is good at 154-her HDLs (good cholesterol) are very good at 73, her LDLs are good at 64, triglycerides good at

## 2021-04-30 NOTE — TELEPHONE ENCOUNTER
----- Message from YESENIA Flores sent at 4/30/2021  9:51 AM CDT -----  Please notify patient that her iron is normal, her B12 is on the lower side of normal at 317-optimal would be around 500 recommend over-the-counter B12 supplement and foods rich

## 2021-04-30 NOTE — TELEPHONE ENCOUNTER
I called and spoke to the pt and informed her of the below results and recommendations. She states that she understands the results and recommendations. She has no further questions at this time.

## 2021-07-29 NOTE — PROGRESS NOTES
Segundo Zimmerman is a 31year old female. Patient presents with:  Medication Follow-Up      HPI:   Patient presents today for a follow-up visit for her anxiety.   Patient states that she is not sleeping well at night–states she wakes up every night compl No    Family History   Problem Relation Age of Onset   • Cancer Maternal Grandmother         Allergies    Xanax                       Comment:Other reaction(s): WITHDRAWL  Albuterol                 Pertussis Vaccine           REVIEW OF SYSTEMS:   GENERAL H it for later. -     Boost or Ensure shakes     Start Lexapro daily Klonopin sparingly     Follow up in 4-6 weeks

## 2021-08-09 RX ORDER — PROPRANOLOL HYDROCHLORIDE 60 MG/1
TABLET ORAL
Qty: 90 TABLET | Refills: 0 | Status: SHIPPED | OUTPATIENT
Start: 2021-08-09 | End: 2021-10-06

## 2021-08-09 NOTE — TELEPHONE ENCOUNTER
Future appt:     Your appointments     Date & Time Appointment Department [facility])    Sep 17, 2021 10:00 AM CDT Follow Up Visit with Pravin Butler, [facility] (Chuy Pacheco)            [address] [place] [address]

## 2021-08-17 NOTE — PROGRESS NOTES
[facility] [place] [facility]  PROGRESS NOTE  Chief Complaint:   Patient presents with:  Medication Follow-Up      HPI:   This is a 31year old female coming in for reestablishing care in our office.   She had been living in [facility] for a number of years but 114 50 - 170 ug/dL    Transferrin 250 200 - 360 mg/dL    Total Iron Binding Capacity 373 240 - 450 ug/dL    % Saturation 31 15 - 50 %   FERRITIN   Result Value Ref Range    Ferritin 43.0 12.0 - 114.0 ng/mL   CBC W/ DIFFERENTIAL   Result Value Ref Range 60 MG Oral Tab TAKE 1 TABLET BY MOUTH AS NEEDED(FOR TACHYCARDIA, ANXITY) 90 tablet 0   • escitalopram 10 MG Oral Tab Take 1 tablet (10 mg total) by mouth daily.  30 tablet 1   • clonazePAM (KLONOPIN) 0.5 MG Oral Tab Take 1 tablet (0.5 mg total) by mouth nig Temp 98.9 °F (37.2 °C) (Tympanic)   Resp 16   Ht 5' 4.5\" (1.638 m)   Wt 127 lb 9.6 oz (57.9 kg)   LMP 07/19/2021 (Exact Date)   SpO2 97%   BMI 21.56 kg/m²  Estimated body mass index is 21.56 kg/m² as calculated from the following:    Height as of this enc understanding. Patient is notified to call with any questions, complications, allergies, or worsening or changing symptoms. Patient is to call with any side effects or complications from the treatments as a result of today.      Problem List:  Patient Acti

## 2021-09-04 RX ORDER — PROPRANOLOL HYDROCHLORIDE 60 MG/1
TABLET ORAL
Qty: 90 TABLET | Refills: 0 | OUTPATIENT
Start: 2021-09-04

## 2021-09-04 NOTE — TELEPHONE ENCOUNTER
Future appt:     Your appointments     Date & Time Appointment Department [facility])    Sep 17, 2021 10:00 AM CDT Follow Up Visit with Lon Lainez, [address], [facility]        Feb 18, 2022  2:0

## 2021-09-17 NOTE — PROGRESS NOTES
Zohra Banuelos is a 31year old female. Patient presents with: Follow - Up: anxiety and depression      HPI:   Patient returns to the office today for a recheck of her anxiety. Patient states that overall she is feeling a lot better.   States that sh Tab Take 50 mg by mouth every 6 (six) hours as needed.         Past Medical History:   Diagnosis Date   • ADD (attention deficit disorder)    • Anxiety    • Depression       Social History:  Social History    Tobacco Use      Smoking status: Never Smoker Increase EXERCISE, eat a healthy diet (5 fruits and/or vegetables a day), stay hydrated,  take a multivitamin, take fish/krill oil capsules, learn something new, avoid excessive caffeine, avoid alcohol, cigarettes, and street drugs.      Continue Lexapro d

## 2021-10-06 NOTE — TELEPHONE ENCOUNTER
Propranolol: 8/9/21    Follow up in 3 months for pap & physical and recheck of lexapro-   Future appt:     Your appointments     Date & Time Appointment Department [facility])    Feb 18, 2022  2:00 PM CST Follow up - Extended with Hansel Leventhal, MD [facility]

## 2021-10-08 RX ORDER — PROPRANOLOL HYDROCHLORIDE 60 MG/1
TABLET ORAL
Qty: 90 TABLET | Refills: 0 | Status: SHIPPED | OUTPATIENT
Start: 2021-10-08

## 2021-11-29 RX ORDER — ESCITALOPRAM OXALATE 10 MG/1
10 TABLET ORAL DAILY
Qty: 90 TABLET | Refills: 0 | Status: SHIPPED | OUTPATIENT
Start: 2021-11-29 | End: 2022-02-02

## 2021-11-29 RX ORDER — DEXTROAMPHETAMINE SACCHARATE, AMPHETAMINE ASPARTATE, DEXTROAMPHETAMINE SULFATE AND AMPHETAMINE SULFATE 2.5; 2.5; 2.5; 2.5 MG/1; MG/1; MG/1; MG/1
10 TABLET ORAL DAILY
Qty: 30 TABLET | Refills: 0 | Status: SHIPPED | OUTPATIENT
Start: 2021-11-29

## 2021-11-29 NOTE — TELEPHONE ENCOUNTER
Future appt:     Your appointments     Date & Time Appointment Department [facility])    Feb 18, 2022  2:15 PM CST Follow up - Extended with Merritt Ball MD [address] [place] [address], [place] ([facility])            [facility]

## 2022-04-18 RX ORDER — PROPRANOLOL HYDROCHLORIDE 60 MG/1
TABLET ORAL
Qty: 90 TABLET | Refills: 0 | Status: SHIPPED | OUTPATIENT
Start: 2022-04-18

## 2022-04-18 RX ORDER — DEXTROAMPHETAMINE SACCHARATE, AMPHETAMINE ASPARTATE, DEXTROAMPHETAMINE SULFATE AND AMPHETAMINE SULFATE 2.5; 2.5; 2.5; 2.5 MG/1; MG/1; MG/1; MG/1
10 TABLET ORAL DAILY
Qty: 30 TABLET | Refills: 0 | Status: SHIPPED | OUTPATIENT
Start: 2022-04-18

## 2022-04-18 NOTE — TELEPHONE ENCOUNTER
Patient states she only takes the Adderall as needed. States the last refill end of November, she is just now running out of the Rx. States helping her sister move to the state of [place] and needing Rx to help her focus for the  Drive. Future appt: Your appointments     Date & Time Appointment Department [facility])    May 05, 2022 11:30 AM CDT Follow Up Visit with Em Torres MD [address] [place] [address] ([facility])            [address] [place] [address] [phone]  [phone]        Last Appointment with provider:  8/17/21  Last appointment at [facility]:  2/2/2022  Cholesterol, Total (mg/dL)   Date Value   04/29/2021 154     HDL Cholesterol (mg/dL)   Date Value   04/29/2021 73 (H)     LDL Cholesterol (mg/dL)   Date Value   04/29/2021 64     Triglycerides (mg/dL)   Date Value   04/29/2021 87     No results found for: EAG, A1C  Lab Results   Component Value Date    T4F 1.0 04/29/2021    TSH 1.170 04/29/2021       No follow-ups on file.

## 2022-04-18 NOTE — TELEPHONE ENCOUNTER
Pt needs refill on her Adderall 10 mg and Propranolol 60mg to [facility] in [place]. Pt states that she leaves out of state on Wed morning, would like to get refill on medications before Wednesday.        Your appointments     Date & Time Appointment Department [facility])    May 05, 2022 11:30 AM CDT Follow Up Visit with Valentino Hernandes MD [address] [place] [address] ([facility])            [address] [place] [address]76 [phone]  [phone]

## 2022-05-05 PROBLEM — F41.9 ANXIETY: Status: ACTIVE | Noted: 2022-05-05

## 2022-05-05 NOTE — PATIENT INSTRUCTIONS
Continue current medications  Cut down and stop energy drink. Advice low salt diet and exercise. Monitor your blood pressure. Return to clinic if systolic blood pressure more than 453 or diastolic more than 90. Start Buspar twice a day   Recommend breathing exercise and meditation.

## 2022-05-23 RX ORDER — DEXTROAMPHETAMINE SACCHARATE, AMPHETAMINE ASPARTATE, DEXTROAMPHETAMINE SULFATE AND AMPHETAMINE SULFATE 2.5; 2.5; 2.5; 2.5 MG/1; MG/1; MG/1; MG/1
10 TABLET ORAL DAILY
Qty: 30 TABLET | Refills: 0 | Status: SHIPPED | OUTPATIENT
Start: 2022-06-23 | End: 2022-07-23

## 2022-05-23 RX ORDER — DEXTROAMPHETAMINE SACCHARATE, AMPHETAMINE ASPARTATE, DEXTROAMPHETAMINE SULFATE AND AMPHETAMINE SULFATE 2.5; 2.5; 2.5; 2.5 MG/1; MG/1; MG/1; MG/1
10 TABLET ORAL DAILY
Qty: 30 TABLET | Refills: 0 | Status: SHIPPED | OUTPATIENT
Start: 2022-05-23 | End: 2022-06-22

## 2022-05-23 RX ORDER — DEXTROAMPHETAMINE SACCHARATE, AMPHETAMINE ASPARTATE, DEXTROAMPHETAMINE SULFATE AND AMPHETAMINE SULFATE 2.5; 2.5; 2.5; 2.5 MG/1; MG/1; MG/1; MG/1
10 TABLET ORAL DAILY
Qty: 30 TABLET | Refills: 0 | Status: CANCELLED | OUTPATIENT
Start: 2022-05-23

## 2022-05-23 RX ORDER — DEXTROAMPHETAMINE SACCHARATE, AMPHETAMINE ASPARTATE, DEXTROAMPHETAMINE SULFATE AND AMPHETAMINE SULFATE 2.5; 2.5; 2.5; 2.5 MG/1; MG/1; MG/1; MG/1
10 TABLET ORAL DAILY
Qty: 30 TABLET | Refills: 0 | Status: SHIPPED | OUTPATIENT
Start: 2022-07-24 | End: 2022-08-23

## 2022-05-23 NOTE — TELEPHONE ENCOUNTER
Last ADD Rx Refill: 4/18/22    Future appt: Your appointments     Date & Time Appointment Department [facility])    Nov 09, 2022  1:00 PM CST Follow Up Visit with Guillaume Mirza MD [address] [facility], [place] ([facility])            25 [facility]  [facility] [phone]  [phone]        Last Appointment with provider:   Visit date not found  Last appointment at [facility]:  5/5/2022 /ANA  Cholesterol, Total (mg/dL)   Date Value   04/29/2021 154     HDL Cholesterol (mg/dL)   Date Value   04/29/2021 73 (H)     LDL Cholesterol (mg/dL)   Date Value   04/29/2021 64     Triglycerides (mg/dL)   Date Value   04/29/2021 87     No results found for: EAG, A1C  Lab Results   Component Value Date    T4F 1.0 04/29/2021    TSH 1.170 04/29/2021       No follow-ups on file.

## 2022-06-14 ENCOUNTER — OFFICE VISIT (OUTPATIENT)
Dept: FAMILY MEDICINE CLINIC | Facility: CLINIC | Age: 27
End: 2022-06-14
Payer: COMMERCIAL

## 2022-06-14 ENCOUNTER — LAB ENCOUNTER (OUTPATIENT)
Dept: LAB | Age: 27
End: 2022-06-14
Attending: NURSE PRACTITIONER
Payer: COMMERCIAL

## 2022-06-14 VITALS
SYSTOLIC BLOOD PRESSURE: 128 MMHG | HEART RATE: 101 BPM | OXYGEN SATURATION: 96 % | TEMPERATURE: 98 F | WEIGHT: 129 LBS | DIASTOLIC BLOOD PRESSURE: 80 MMHG | RESPIRATION RATE: 18 BRPM | BODY MASS INDEX: 21.75 KG/M2 | HEIGHT: 64.5 IN

## 2022-06-14 DIAGNOSIS — Z11.1 SCREENING FOR TUBERCULOSIS: ICD-10-CM

## 2022-06-14 DIAGNOSIS — Z00.00 ENCOUNTER FOR HEALTH MAINTENANCE EXAMINATION IN ADULT: Primary | ICD-10-CM

## 2022-06-14 PROCEDURE — 3008F BODY MASS INDEX DOCD: CPT | Performed by: NURSE PRACTITIONER

## 2022-06-14 PROCEDURE — 3079F DIAST BP 80-89 MM HG: CPT | Performed by: NURSE PRACTITIONER

## 2022-06-14 PROCEDURE — 3074F SYST BP LT 130 MM HG: CPT | Performed by: NURSE PRACTITIONER

## 2022-06-14 PROCEDURE — 99395 PREV VISIT EST AGE 18-39: CPT | Performed by: NURSE PRACTITIONER

## 2022-06-14 PROCEDURE — 86480 TB TEST CELL IMMUN MEASURE: CPT | Performed by: NURSE PRACTITIONER

## 2022-06-14 NOTE — PATIENT INSTRUCTIONS
Need proof of tetanus shot      Quantiferon gold TB test today -     Follow up for pap and breast exam.     Will complete form for day care when TB test is back and proof of tetanus

## 2022-06-16 ENCOUNTER — TELEPHONE (OUTPATIENT)
Dept: FAMILY MEDICINE CLINIC | Facility: CLINIC | Age: 27
End: 2022-06-16

## 2022-06-16 LAB
M TB IFN-G CD4+ T-CELLS BLD-ACNC: 0.06 IU/ML
M TB TUBERC IFN-G BLD QL: NEGATIVE
M TB TUBERC IGNF/MITOGEN IGNF CONTROL: >10 IU/ML
QFT TB1 AG MINUS NIL: -0.02 IU/ML
QFT TB2 AG MINUS NIL: -0.03 IU/ML

## 2022-06-16 NOTE — TELEPHONE ENCOUNTER
----- Message from YESENIA Villafuerte sent at 6/16/2022  1:46 PM CDT -----  Please notify patient that her quantiferion gold TB is negative-  we need proof of tetanus shot for the form's completion

## 2022-06-16 NOTE — TELEPHONE ENCOUNTER
Called and told pt result. Asked pt where was that location that she had the Tdap done at and she states that she wrote it out on a medical records release.

## 2022-06-20 NOTE — TELEPHONE ENCOUNTER
Called pt to see if she took home the medical records release papers because they are not here.     Need to fill out again and send to [facility]

## 2022-06-27 NOTE — TELEPHONE ENCOUNTER
Future appt: Your appointments     Date & Time Appointment Department [facility])    Nov 09, 2022  1:00 PM CST Follow Up Visit with Keerthi Spicer MD [address] [facility] ([facility])            [facility] [phone]  [phone]        Last Appointment with provider:   Visit date not found  Last appointment at [facility]:  6/14/2022  Cholesterol, Total (mg/dL)   Date Value   04/29/2021 154     HDL Cholesterol (mg/dL)   Date Value   04/29/2021 73 (H)     LDL Cholesterol (mg/dL)   Date Value   04/29/2021 64     Triglycerides (mg/dL)   Date Value   04/29/2021 87     No results found for: EAG, A1C  Lab Results   Component Value Date    T4F 1.0 04/29/2021    TSH 1.170 04/29/2021     Last RF:  5/23/2022    No follow-ups on file.

## 2022-06-28 RX ORDER — DEXTROAMPHETAMINE SACCHARATE, AMPHETAMINE ASPARTATE, DEXTROAMPHETAMINE SULFATE AND AMPHETAMINE SULFATE 2.5; 2.5; 2.5; 2.5 MG/1; MG/1; MG/1; MG/1
10 TABLET ORAL DAILY
Qty: 30 TABLET | Refills: 0 | Status: SHIPPED | OUTPATIENT
Start: 2022-06-28 | End: 2022-07-28

## 2022-08-01 RX ORDER — DEXTROAMPHETAMINE SACCHARATE, AMPHETAMINE ASPARTATE, DEXTROAMPHETAMINE SULFATE AND AMPHETAMINE SULFATE 2.5; 2.5; 2.5; 2.5 MG/1; MG/1; MG/1; MG/1
10 TABLET ORAL DAILY
Qty: 30 TABLET | Refills: 0 | Status: SHIPPED | OUTPATIENT
Start: 2022-08-01 | End: 2022-08-31

## 2022-08-01 NOTE — TELEPHONE ENCOUNTER
Future appt: Your appointments     Date & Time Appointment Department [facility])    Nov 09, 2022  1:00 PM CST Follow Up Visit with Merari Rod[facility]guez MD [facility] ([facility])            25 [facility] 1076 [phone]  [phone]        Last Appointment with provider:   Visit date not found  Last appointment at [facility]:  6/14/2022  / ADD  Cholesterol, Total (mg/dL)   Date Value   04/29/2021 154     HDL Cholesterol (mg/dL)   Date Value   04/29/2021 73 (H)     LDL Cholesterol (mg/dL)   Date Value   04/29/2021 64     Triglycerides (mg/dL)   Date Value   04/29/2021 87     No results found for: EAG, A1C  Lab Results   Component Value Date    T4F 1.0 04/29/2021    TSH 1.170 04/29/2021       No follow-ups on file.

## 2022-08-16 ENCOUNTER — TELEPHONE (OUTPATIENT)
Dept: FAMILY MEDICINE CLINIC | Facility: CLINIC | Age: 27
End: 2022-08-16

## 2022-08-16 NOTE — TELEPHONE ENCOUNTER
Patient was wondering if she can get a prescription for Tramodal?  Her physician in [place] prescribed it. Looking for one week.   Future Appointments   Date Time Provider Diana Pineda   11/9/2022  1:00 PM Nahid Grewal MD [facility] [place] [facility]

## 2022-08-16 NOTE — TELEPHONE ENCOUNTER
Pt states she was prescribed tramadol 50 mg prn by previous physician for period cramps.(under medication list-med history) She no longer sees that physician and asking If you can fill it.

## 2022-08-16 NOTE — TELEPHONE ENCOUNTER
Patient was on her menses at the time of the physical - I did not do a pap or pelvic exam.  Tramadol is typically not used for menstrual cramps- if patient has severe cramps- she may want to see a gyne here for evaluation and treatment.  Please have patient/gyne send us a copy of her pap

## 2022-08-22 ENCOUNTER — OFFICE VISIT (OUTPATIENT)
Dept: FAMILY MEDICINE CLINIC | Facility: CLINIC | Age: 27
End: 2022-08-22
Payer: COMMERCIAL

## 2022-08-22 VITALS
HEIGHT: 64.5 IN | SYSTOLIC BLOOD PRESSURE: 116 MMHG | DIASTOLIC BLOOD PRESSURE: 82 MMHG | WEIGHT: 124 LBS | BODY MASS INDEX: 20.91 KG/M2 | HEART RATE: 66 BPM | RESPIRATION RATE: 14 BRPM | TEMPERATURE: 98 F | OXYGEN SATURATION: 99 %

## 2022-08-22 DIAGNOSIS — N63.13 MASS OF LOWER OUTER QUADRANT OF RIGHT BREAST: Primary | ICD-10-CM

## 2022-08-22 PROCEDURE — 99214 OFFICE O/P EST MOD 30 MIN: CPT | Performed by: NURSE PRACTITIONER

## 2022-08-22 PROCEDURE — 3008F BODY MASS INDEX DOCD: CPT | Performed by: NURSE PRACTITIONER

## 2022-08-22 PROCEDURE — 3079F DIAST BP 80-89 MM HG: CPT | Performed by: NURSE PRACTITIONER

## 2022-08-22 PROCEDURE — 3074F SYST BP LT 130 MM HG: CPT | Performed by: NURSE PRACTITIONER

## 2022-09-06 ENCOUNTER — TELEPHONE (OUTPATIENT)
Dept: FAMILY MEDICINE CLINIC | Facility: CLINIC | Age: 27
End: 2022-09-06

## 2022-09-06 RX ORDER — DEXTROAMPHETAMINE SACCHARATE, AMPHETAMINE ASPARTATE, DEXTROAMPHETAMINE SULFATE AND AMPHETAMINE SULFATE 2.5; 2.5; 2.5; 2.5 MG/1; MG/1; MG/1; MG/1
10 TABLET ORAL DAILY
Qty: 30 TABLET | Refills: 0 | Status: SHIPPED | OUTPATIENT
Start: 2022-09-06 | End: 2022-10-06

## 2022-09-06 NOTE — TELEPHONE ENCOUNTER
Last appt 5/5/22 advised Return in about 6 months (around 11/5/2022) for follow up. Future appt: Your appointments     Date & Time Appointment Department [facility])    Nov 09, 2022  1:00 PM CST Follow Up Visit with Keerthi Spicer MD [address] [place] [address])            [address] [place] [address] [phone]  [phone]        Last Appointment with provider:   Visit date not found  Last appointment at [facility]:  8/22/2022  Cholesterol, Total (mg/dL)   Date Value   04/29/2021 154     HDL Cholesterol (mg/dL)   Date Value   04/29/2021 73 (H)     LDL Cholesterol (mg/dL)   Date Value   04/29/2021 64     Triglycerides (mg/dL)   Date Value   04/29/2021 87     No results found for: EAG, A1C  Lab Results   Component Value Date    T4F 1.0 04/29/2021    TSH 1.170 04/29/2021       No follow-ups on file.

## 2022-09-06 NOTE — TELEPHONE ENCOUNTER
Calling to check on status of refill. Is going out of town early this afternoon and would like the refill as close to noon as possible.

## 2022-10-17 ENCOUNTER — TELEPHONE (OUTPATIENT)
Dept: FAMILY MEDICINE CLINIC | Facility: CLINIC | Age: 27
End: 2022-10-17

## 2022-10-17 RX ORDER — VALACYCLOVIR HYDROCHLORIDE 1 G/1
1000 TABLET, FILM COATED ORAL EVERY 12 HOURS
Qty: 4 TABLET | Refills: 3 | Status: SHIPPED | OUTPATIENT
Start: 2022-10-17 | End: 2022-10-19

## 2022-10-17 NOTE — TELEPHONE ENCOUNTER
wants to get get valcyclovir refilled . got while living in [facility]. wants to know if Dr. Kita Moseley would refill.

## 2022-10-17 NOTE — TELEPHONE ENCOUNTER
Future appt: Your appointments     Date & Time Appointment Department [facility])    Nov 09, 2022  1:00 PM CST Follow Up Visit with Shayy Posada MD 25 [facility] ([facility])            [address] [place] [address]  [phone]        Last Appointment with provider:   8/22/2022 for breast lump. 6/14/22 for annual physical.  [facility] appointment at [facility]:  8/22/2022  Cholesterol, Total (mg/dL)   Date Value   04/29/2021 154     HDL Cholesterol (mg/dL)   Date Value   04/29/2021 73 (H)     LDL Cholesterol (mg/dL)   Date Value   04/29/2021 64     Triglycerides (mg/dL)   Date Value   04/29/2021 87     No results found for: EAG, A1C  Lab Results   Component Value Date    T4F 1.0 04/29/2021    TSH 1.170 04/29/2021       No follow-ups on file.

## 2022-10-26 ENCOUNTER — TELEPHONE (OUTPATIENT)
Dept: FAMILY MEDICINE CLINIC | Facility: CLINIC | Age: 27
End: 2022-10-26

## 2022-10-26 NOTE — TELEPHONE ENCOUNTER
Received request to send all medical records, including [employer] records to [facility], [facility], [facility], [facility] .  Sent to [employer]

## 2022-11-09 ENCOUNTER — TELEPHONE (OUTPATIENT)
Dept: FAMILY MEDICINE CLINIC | Facility: CLINIC | Age: 27
End: 2022-11-09

## 2022-11-09 NOTE — TELEPHONE ENCOUNTER
Called pt to let her know she missed an appt today. Patient stated she moved and forgot she had an appt scheduled. Informed patient there is a 40.00 no show charge for missing appointments.

## 2023-06-29 ENCOUNTER — TELEPHONE (OUTPATIENT)
Dept: FAMILY MEDICINE CLINIC | Facility: CLINIC | Age: 28
End: 2023-06-29

## 2023-06-29 RX ORDER — CLONAZEPAM 0.5 MG/1
0.5 TABLET ORAL 2 TIMES DAILY PRN
Qty: 30 TABLET | Refills: 0 | Status: SHIPPED | OUTPATIENT
Start: 2023-06-29

## 2023-07-21 NOTE — TELEPHONE ENCOUNTER
Future appt: Your appointments       Date & Time Appointment Department [facility])    Aug 31, 2023 10:40 AM CDT Follow Up Visit with Virginia Cueto MD [address] [place] [address], [place] ([facility])              [address] [place] [address] [facility] [phone]  [phone]          Last Appointment with provider:   6/1/2023 for anxiety. Last appointment at [facility]:  6/1/2023  Cholesterol, Total (mg/dL)   Date Value   04/29/2021 154     HDL Cholesterol (mg/dL)   Date Value   04/29/2021 73 (H)     LDL Cholesterol (mg/dL)   Date Value   04/29/2021 64     Triglycerides (mg/dL)   Date Value   04/29/2021 87     No results found for: EAG, A1C  Lab Results   Component Value Date    T4F 1.0 04/29/2021    TSH 1.170 04/29/2021       No follow-ups on file.

## 2023-07-22 RX ORDER — CLONAZEPAM 0.5 MG/1
0.5 TABLET ORAL 2 TIMES DAILY PRN
Qty: 30 TABLET | Refills: 0 | OUTPATIENT
Start: 2023-07-22

## 2023-07-24 RX ORDER — CLONAZEPAM 0.5 MG/1
0.5 TABLET ORAL 2 TIMES DAILY PRN
Qty: 30 TABLET | Refills: 0 | OUTPATIENT
Start: 2023-07-24

## 2023-07-25 RX ORDER — CLONAZEPAM 0.5 MG/1
0.5 TABLET ORAL 2 TIMES DAILY PRN
Qty: 30 TABLET | Refills: 0 | Status: SHIPPED | OUTPATIENT
Start: 2023-07-25

## 2023-07-25 NOTE — TELEPHONE ENCOUNTER
Patient has appointment scheduled.   Future Appointments   Date Time Provider Diana Pineda   8/31/2023 10:40 AM Bria Silva MD [facility] [place] [facility]

## 2023-09-14 DIAGNOSIS — F98.8 ATTENTION DEFICIT DISORDER (ADD) WITHOUT HYPERACTIVITY: ICD-10-CM

## 2023-09-14 RX ORDER — DEXTROAMPHETAMINE SACCHARATE, AMPHETAMINE ASPARTATE, DEXTROAMPHETAMINE SULFATE AND AMPHETAMINE SULFATE 5; 5; 5; 5 MG/1; MG/1; MG/1; MG/1
1 TABLET ORAL 2 TIMES DAILY
Qty: 60 TABLET | Refills: 0 | Status: SHIPPED | OUTPATIENT
Start: 2023-09-14

## 2023-09-25 ENCOUNTER — TELEPHONE (OUTPATIENT)
Dept: FAMILY MEDICINE CLINIC | Facility: CLINIC | Age: 28
End: 2023-09-25

## 2023-09-25 NOTE — TELEPHONE ENCOUNTER
I have not seen patient in over a year, looks like Lacy Brand saw her for anxiety in June and Dr. Kiley Ruggiero saw her for ADHD in August-I can forward it to those providers to see if they will prescribe.

## 2023-09-25 NOTE — TELEPHONE ENCOUNTER
Pt wants to know if richard could give her a refill on lexapro, states that she prescribed it about a year ago. Wants to know if she could get  a weeks worth to [facility] in [place].        Your appointments       Date & Time Appointment Department [facility])    Oct 04, 2023  1:00 PM CDT Physical - Established with YESENIA [facility]lsa [address] [facility] ([facility])        Feb 12, 2024 11:00 AM CST Follow up - Extended with Shannen Rodríguez MD [address], [address] [place] ([place])              [address] [facility]  [facility] 1076 [phone]  [phone]

## 2023-09-26 NOTE — TELEPHONE ENCOUNTER
Needs to discuss her medications at an appointment. No refills or new medications at this time. Has appointment next week with YESENIA Malone.

## 2023-09-26 NOTE — TELEPHONE ENCOUNTER
Spoke with pt verbally pt verbalized understanding of below message. Needs to discuss her medications at an appointment. No refills or new medications at this time. Has appointment next week with YESENIA Tian.

## (undated) NOTE — MR AVS SNAPSHOT
[address]  [place] [address]64 [id number] [phone]               Thank you for choosing us for your health care visit with [facility] [place].   We are glad to serve you and happy to provide you with this summary o or having a heart or lung problem. Cigarette smoking also makes bronchitis more likely. When bronchitis develops, the airways become swollen. The airways may also become infected with bacteria. This is known as a secondary infection.   Diagnosing acute bro · Symptoms that don’t start to improve within a week, or within 3 days of taking antibiotics   Date Last Reviewed: 8/4/2014  © 5458-2610 [facility] [address] [place] [address], [address] [place] [address]. All rights reserved.  This information is not inte medications prescribed for you. Read the directions carefully, and ask your doctor or other care provider to review them with you.          Where to Get Your Medications      These medications were sent to [facility] [address], [place] [address] [place] Aerobic physical activity Regular aerobic physical activity (e.g., brisk walking, light jogging, cycling, swimming, etc.) for a goal of at least 150 minutes per week. Moderation of alcohol consumption Men: limit to <= 2 drinks* per day.   Women and lighte

## (undated) NOTE — LETTER
Date: 11/23/2018    Patient Name: Dai Chan          To Whom it may concern: This letter has been written at the patient's request. The above patient was seen at the [facility] for treatment of a medical condition.     This patient

## (undated) NOTE — MR AVS SNAPSHOT
[address]  [place] [address] [phone]  [phone]               Thank you for choosing us for your health care visit with Jolene Faulkner MD.  We are glad to serve you and happy to provide you with this summary treatment use x 4 days. May repeat this cycle 4 times. Propranolol HCl 60 MG Tabs   Take 60 mg by mouth as needed (for Tachycardia, anxiety).    Commonly known as:  INDERAL                Where to Get Your Medications      These medications were s

## (undated) NOTE — Clinical Note
Date: 2/6/2017    Patient Name: Zohra Banuelos          To Whom it may concern: This letter has been written at the patient's request. The above patient was seen at the [facility] for treatment of a medical condition.     This patient s

## (undated) NOTE — Clinical Note
Date: 2/1/2017    Patient Name: Melissa Briseno          To Whom it may concern: This letter has been written at the patient's request. The above patient was seen at the [facility] for treatment of a medical condition.     This patient s

## (undated) NOTE — LETTER
Date: 9/11/2017    Patient Name: Kelly Anguiano          To Whom it may concern: This letter has been written at the patient's request. The above patient was seen at the [facility] for treatment of a medical condition.     This patient

## (undated) NOTE — MR AVS SNAPSHOT
[address]  [place] [address] [phone]               Thank you for choosing us for your health care visit with Sadie Douglas PA-C.   We are glad to serve you and happy to provide you with this summary Rinses help keep your sinuses and nose moist. Mix a teaspoon of salt in 8 ounces of fresh, warm water. Use a bulb syringe to gently squirt the water into your nose a few times a day. You can also buy ready-made saline nasal sprays.   Apply hot or cold packs Take two tablets by mouth today, then one tablet daily.    Commonly known as:  ZITHROMAX Z-LEONARD           FLOVENT  MCG/ACT [employer]   Generic drug:  Fluticasone Propionate HFA           Fluticasone 250 MCG/BLIST Aepb   Inhale 1 puff into the lungs 2 (two) Enter your [employer] Activation Code exactly as it appears below along with your Zip Code and Date of Birth to complete the sign-up process. If you do not sign up before the expiration date, you must request a new code.     Your unique [employer] Access Code: [email or web address]

## (undated) NOTE — MR AVS SNAPSHOT
[address]  [place] [address] [phone]  [phone]               Thank you for choosing us for your health care visit with RODGER Moya.   We are glad to serve you and happy to provide you with this summary of yo Assoc Dx:  Vaginal discharge [N89.8], Unprotected sexual intercourse [Z72.51]           UA/M With Culture Reflex [E]    Complete by:   Mar 28, 2017    Assoc Dx:  Vaginal discharge [N89.8]                 Reason for Today's Visit     Vaginal Problem [employer] will allow you to access patient instructions from your recent visit,  view other health information, and more. To sign up or find more information, go to [email or web address] and click on the Sign Up Now link in the [employer] box.      Enter